# Patient Record
Sex: FEMALE | Race: WHITE | URBAN - METROPOLITAN AREA
[De-identification: names, ages, dates, MRNs, and addresses within clinical notes are randomized per-mention and may not be internally consistent; named-entity substitution may affect disease eponyms.]

---

## 2024-06-03 ENCOUNTER — TELEPHONE (OUTPATIENT)
Age: 64
End: 2024-06-03

## 2024-06-03 NOTE — TELEPHONE ENCOUNTER
Pt called stating Sharmila had asked for dosages pt was taking of calcium and vit D, pt states she is taking calcium 600 mg with vit D 20 mcg and then also a separate vit D that is 50 mcg - can you please advise if pt should continue these?

## 2024-06-03 NOTE — TELEPHONE ENCOUNTER
Called and notifiy patient  Jose Armando. Verified patient  on her HIPAA form. Informed if patient has any questions to feel free to call the office

## 2024-06-24 ENCOUNTER — TELEPHONE (OUTPATIENT)
Age: 64
End: 2024-06-24

## 2024-06-24 NOTE — TELEPHONE ENCOUNTER
Ghazal called about a bill from the old office from a visit back in April 2024.    She states she called [facility] and they told her she has no balance. She came in and talked to Malini and she said no payment. Last statement is from April 2024. But she got a phone automated message yesterday 6/23 about if she doesn't pay her bill it is going to collections. The amount is like $18.44?    Is there a way to check in the old system for an outstanding balance?    She is going to call this collection number and see if they are up to date on what is going about the bill.     Please call her back and follow up with this.    Thank you.

## 2024-10-08 ENCOUNTER — TELEPHONE (OUTPATIENT)
Age: 64
End: 2024-10-08

## 2024-10-08 NOTE — TELEPHONE ENCOUNTER
Submitted prior auth request through [email or web address]. medication needs to be approved and we will need medication shipped to our office prior to 10/28/24 appointment. Faxed lab order to [facility] central scheduling.

## 2024-10-08 NOTE — TELEPHONE ENCOUNTER
----- Message from Reina COLEMAN sent at 10/7/2024 10:15 AM EDT -----  Regarding: Labs for Prolia  Patient called and scheduled Prolia inject. 10/28/2024.  She needs [facility] req. To [facility].

## 2024-10-21 ENCOUNTER — TELEPHONE (OUTPATIENT)
Age: 64
End: 2024-10-21

## 2024-10-21 DIAGNOSIS — M81.0 AGE-RELATED OSTEOPOROSIS WITHOUT CURRENT PATHOLOGICAL FRACTURE: Primary | ICD-10-CM

## 2024-10-21 NOTE — TELEPHONE ENCOUNTER
[facility] called, pt is there for Vit D and calcium lab draw for upcoming Prolia but they do not have the orders there, there is a note it was faxed previously but I cannot find that order - can new Vit D and calcium order please be done? I will fax to [phone]

## 2024-10-22 ENCOUNTER — TELEPHONE (OUTPATIENT)
Age: 64
End: 2024-10-22

## 2024-10-22 DIAGNOSIS — M81.0 AGE-RELATED OSTEOPOROSIS WITHOUT CURRENT PATHOLOGICAL FRACTURE: Primary | ICD-10-CM

## 2024-10-22 NOTE — TELEPHONE ENCOUNTER
Kodi from [email or web address] called stating they got the approval for the prolia however they need a current script sent to [email or web address].

## 2024-10-23 NOTE — TELEPHONE ENCOUNTER
Not sure what is going on. We have received patient's Prolia medication, in fridge. Nothing else needed now for Rx. Lab orders already faxed. Lab results are in care everywhere through [facility].

## 2024-10-23 NOTE — TELEPHONE ENCOUNTER
Prolia order placed. Printed. Please make sure patient is aware that she will need to have blood work completed (and reviewed) prior to each injection.

## 2024-10-28 ENCOUNTER — NURSE ONLY (OUTPATIENT)
Age: 64
End: 2024-10-28
Payer: COMMERCIAL

## 2024-10-28 VITALS — OXYGEN SATURATION: 96 % | HEART RATE: 67 BPM | SYSTOLIC BLOOD PRESSURE: 136 MMHG | DIASTOLIC BLOOD PRESSURE: 70 MMHG

## 2024-10-28 DIAGNOSIS — M81.0 AGE-RELATED OSTEOPOROSIS WITHOUT CURRENT PATHOLOGICAL FRACTURE: ICD-10-CM

## 2024-10-28 DIAGNOSIS — M81.0 AGE-RELATED OSTEOPOROSIS WITHOUT CURRENT PATHOLOGICAL FRACTURE: Primary | ICD-10-CM

## 2024-10-28 PROCEDURE — 99214 OFFICE O/P EST MOD 30 MIN: CPT | Performed by: NURSE PRACTITIONER

## 2024-10-28 PROCEDURE — 3078F DIAST BP <80 MM HG: CPT | Performed by: NURSE PRACTITIONER

## 2024-10-28 PROCEDURE — 3075F SYST BP GE 130 - 139MM HG: CPT | Performed by: NURSE PRACTITIONER

## 2024-10-28 NOTE — PROGRESS NOTES
[facility], [facility] UROGYNECOLOGY AND PELVIC REHABILITATION   [address] [place] [address]  Dept: [phone]  Date: 10/28/2024  Patient Name: Ghazal De Paz    VISIT - FOLLOW UP VISIT     CC: Prolia injection, discuss osteoporosis, concerns with future insurance coverage      HPI: Pt has been using Prolia for just over two years, no SE, desires to continue to decrease risk of fracture.   Calcium and vit d 10/21/24 wnl  Comprehensive metabolic panel  Order: [id number]  Component  Ref Range & Units 10/21/24 1047   Sodium  134 - 146 mmol/L 140   Potassium, Bld  3.5 - 5.0 mmol/L 4.1   Chloride  98 - 109 mmol/L 104   CO2  22 - 32 mmol/L 26   Anion gap  5 - 15 mmol/L 10   BUN  5 - 27 mg/dL 12   Creatinine  0.40 - 1.00 mg/dL 0.66   Comment: METHOD TRACEABLE TO IDMS STANDARD   Glucose  65 - 99 mg/dL 93   Calcium  8.5 - 10.5 mg/dL 9.6   Total Protein  6.0 - 8.0 g/dL 7.1   Albumin  3.2 - 5.3 g/dL 4.4   Alkaline Phosphatase  39 - 130 U/L 50   AST  0 - 41 U/L 23   ALT  0 - 31 U/L 23   Total bilirubin  0.3 - 1.2 mg/dL 0.6   eGFR (CKD-EPI)non-race dependent  >59 ml/min/1.73sq.m >90     Overall state of well-being, dietary and nutritional habits, exercise routines of at least 30 minutes 3 times a week, bowel and bladder function, smoking history, HRT history, sexual activity and partner/s, dyspareunia, and immunizations all revisited if necessary by chart review or direct questioning.    Topics of Prolapse, Pain, Pressure were revisited including type, period of onset, level of severity, quality and quantity, associations, trends, exacerbators, alleviators, bleeding, prolapse to reduce, splinting, and prior treatment / surgery.    Topics of Urinary Leakage were revisited including type, period of onset, level of severity, quality and quantity, associations, trends, exacerbators, alleviators, urgency, frequency, nocturia ,urge incontinence / stress

## 2024-10-29 RX ORDER — DENOSUMAB 60 MG/ML
INJECTION SUBCUTANEOUS
Qty: 1 ML | Refills: 0 | Status: SHIPPED | OUTPATIENT
Start: 2024-10-29

## 2024-10-29 RX ORDER — DENOSUMAB 60 MG/ML
INJECTION SUBCUTANEOUS SEE ADMIN INSTRUCTIONS
COMMUNITY
End: 2024-10-29

## 2024-10-29 RX ORDER — LORATADINE 10 MG/1
10 TABLET ORAL DAILY PRN
COMMUNITY
Start: 2024-08-26

## 2024-10-29 RX ORDER — LISINOPRIL 5 MG/1
5 TABLET ORAL EVERY MORNING
COMMUNITY

## 2025-02-20 NOTE — PROGRESS NOTES
voluntary basis. Reminders to complete the questionnaires will not be given. The patient understands that failure to return the questionnaires may not give the provider a full picture of the patient's urogynecologic issues and make treatment less than optimal despite the practitioner's best effort to obtain information.     Multiple records reviewed. All questions were addressed to the patient's satisfaction.  40 min total, 20 related to issues    Follow up: No follow-ups on file.     Electronically signed by WENCESLAO Omalley CNP on 2/24/2025 at 3:13 PM    EMR / Voice Dictation Disclaimer: - This note is created with the assistance of a speech recognition program.  While intending to generate a timely document that accurately reflects the content of the visit, there is no guarantee every grammatical, syntax, or spelling error has been or will be identified or corrected.  Additionally, system limitations of the EMR and voice recognition software beyond the control of the practitioner may cause unintentional errors or omissions not identified or corrected at the time of record finalization and signature.

## 2025-02-24 ENCOUNTER — HOSPITAL ENCOUNTER (OUTPATIENT)
Age: 65
Setting detail: SPECIMEN
Discharge: HOME | End: 2025-02-24

## 2025-02-24 ENCOUNTER — OFFICE VISIT (OUTPATIENT)
Age: 65
End: 2025-02-24
Payer: COMMERCIAL

## 2025-02-24 VITALS — HEART RATE: 77 BPM | SYSTOLIC BLOOD PRESSURE: 162 MMHG | OXYGEN SATURATION: 95 % | DIASTOLIC BLOOD PRESSURE: 87 MMHG

## 2025-02-24 DIAGNOSIS — M81.0 AGE-RELATED OSTEOPOROSIS WITHOUT CURRENT PATHOLOGICAL FRACTURE: ICD-10-CM

## 2025-02-24 DIAGNOSIS — I83.93 SPIDER VEINS OF BOTH LOWER EXTREMITIES: ICD-10-CM

## 2025-02-24 DIAGNOSIS — Z78.0 OSTEOPENIA AFTER MENOPAUSE: ICD-10-CM

## 2025-02-24 DIAGNOSIS — M85.89 OSTEOPENIA OF MULTIPLE SITES: ICD-10-CM

## 2025-02-24 DIAGNOSIS — M25.552 BILATERAL HIP PAIN: ICD-10-CM

## 2025-02-24 DIAGNOSIS — Z01.419 ENCOUNTER FOR ANNUAL ROUTINE GYNECOLOGICAL EXAMINATION: Primary | ICD-10-CM

## 2025-02-24 DIAGNOSIS — M25.551 BILATERAL HIP PAIN: ICD-10-CM

## 2025-02-24 DIAGNOSIS — N95.2 ATROPHIC VULVOVAGINITIS: ICD-10-CM

## 2025-02-24 DIAGNOSIS — M85.80 OSTEOPENIA AFTER MENOPAUSE: ICD-10-CM

## 2025-02-24 DIAGNOSIS — Z11.51 SCREENING FOR HPV (HUMAN PAPILLOMAVIRUS): ICD-10-CM

## 2025-02-24 DIAGNOSIS — Z12.31 ENCOUNTER FOR SCREENING MAMMOGRAM FOR MALIGNANT NEOPLASM OF BREAST: ICD-10-CM

## 2025-02-24 DIAGNOSIS — Z87.410 HISTORY OF CERVICAL DYSPLASIA: ICD-10-CM

## 2025-02-24 PROCEDURE — 3077F SYST BP >= 140 MM HG: CPT | Performed by: NURSE PRACTITIONER

## 2025-02-24 PROCEDURE — 99213 OFFICE O/P EST LOW 20 MIN: CPT | Performed by: NURSE PRACTITIONER

## 2025-02-24 PROCEDURE — 3079F DIAST BP 80-89 MM HG: CPT | Performed by: NURSE PRACTITIONER

## 2025-02-24 RX ORDER — DENOSUMAB 60 MG/ML
60 INJECTION SUBCUTANEOUS ONCE
Qty: 1 ML | Refills: 0 | Status: SHIPPED | OUTPATIENT
Start: 2025-02-24 | End: 2025-02-24

## 2025-02-24 ASSESSMENT — ENCOUNTER SYMPTOMS: CONSTIPATION: 1

## 2025-02-24 NOTE — PATIENT INSTRUCTIONS
Daily fiber, miralax/stool softener as needed to prevent constipation/hard stools, increased fiber in diet/water  If you have issues scheduling with Dr. Van for hip pain let me know  Prolia due in April, labs one week prior  Patient wants to check with her insurance to see who is covered in vascular, desires referral for spider veins

## 2025-02-26 LAB
HPV I/H RISK 4 DNA CVX QL NAA+PROBE: NOT DETECTED
HPV SAMPLE: NORMAL
HPV, INTERPRETATION: NORMAL
HPV16 DNA CVX QL NAA+PROBE: NOT DETECTED
HPV18 DNA CVX QL NAA+PROBE: NOT DETECTED
SPECIMEN DESCRIPTION: NORMAL

## 2025-02-28 LAB — CYTOLOGY REPORT: NORMAL

## 2025-03-18 ENCOUNTER — OFFICE VISIT (OUTPATIENT)
Age: 65
End: 2025-03-18
Payer: COMMERCIAL

## 2025-03-18 VITALS — BODY MASS INDEX: 25.2 KG/M2 | WEIGHT: 125 LBS | HEIGHT: 59 IN

## 2025-03-18 DIAGNOSIS — M25.551 RIGHT HIP PAIN: Primary | ICD-10-CM

## 2025-03-18 DIAGNOSIS — M16.11 PRIMARY OSTEOARTHRITIS OF RIGHT HIP: ICD-10-CM

## 2025-03-18 PROCEDURE — 99204 OFFICE O/P NEW MOD 45 MIN: CPT | Performed by: ORTHOPAEDIC SURGERY

## 2025-03-18 NOTE — PROGRESS NOTES
of Paying Living Expenses: Not hard at all   Food Insecurity: No Food Insecurity (2025)    [facility]    Hunger Screening     Within the past 12 months we worried whether our food would run out before we got money to buy more.: Never True     Within the past 12 months the food we bought just didn't last and we didn't have money to get more.: Never True   Transportation Needs: No Transportation Needs (2025)    [facility]    PRAPARE - Transportation     Lack of Transportation (Medical): No     Lack of Transportation (Non-Medical): No   Physical Activity: Not on file   Stress: Not on file   Social Connections: Not on file   Intimate Partner Violence: Not on file   Housing Stability: Low Risk  (2025)    [facility]    Housing Instability     Are you worried or concerned that in the next two months you may not have stable housing that you own, rent or stay in as a part of a household?: No     Past Medical History:   Diagnosis Date    Abnormal cervical Papanicolaou smear     Age-related osteoporosis without current pathological fracture     Atrophic vaginitis     External hemorrhoids     Heart murmur     Hip pain, right     Hypertension     Incontinence in female     Personal history of cervical dysplasia     Urinary retention      Past Surgical History:   Procedure Laterality Date     SECTION      DILATION AND CURETTAGE      ENDOMETRIAL ABLATION      HYSTEROSCOPY      LAPAROSCOPY      LEEP      TUBAL LIGATION       No family history on file.       Please note that this chart was generated using voice recognition Dragon dictation software.  Although every effort was made to ensure the accuracy of this automated transcription, some errors in transcription may have occurred.

## 2025-04-21 LAB
ALBUMIN: NORMAL
ALP BLD-CCNC: NORMAL U/L
ALT SERPL-CCNC: NORMAL U/L
ANION GAP SERPL CALCULATED.3IONS-SCNC: NORMAL MMOL/L
AST SERPL-CCNC: NORMAL U/L
BILIRUB SERPL-MCNC: NORMAL MG/DL
BUN BLDV-MCNC: NORMAL MG/DL
CALCIUM SERPL-MCNC: NORMAL MG/DL
CHLORIDE BLD-SCNC: NORMAL MMOL/L
CO2: NORMAL
CREAT SERPL-MCNC: NORMAL MG/DL
GFR, ESTIMATED: NORMAL
GLUCOSE BLD-MCNC: NORMAL MG/DL
POTASSIUM SERPL-SCNC: NORMAL MMOL/L
SODIUM BLD-SCNC: NORMAL MMOL/L
TOTAL PROTEIN: NORMAL
VITAMIN D 25-HYDROXY: NORMAL
VITAMIN D2, 25 HYDROXY: NORMAL
VITAMIN D3,25 HYDROXY: NORMAL

## 2025-04-24 ENCOUNTER — TELEPHONE (OUTPATIENT)
Age: 65
End: 2025-04-24

## 2025-04-24 ENCOUNTER — RESULTS FOLLOW-UP (OUTPATIENT)
Age: 65
End: 2025-04-24

## 2025-04-24 DIAGNOSIS — M81.0 AGE-RELATED OSTEOPOROSIS WITHOUT CURRENT PATHOLOGICAL FRACTURE: Primary | ICD-10-CM

## 2025-04-24 DIAGNOSIS — M81.0 AGE-RELATED OSTEOPOROSIS WITHOUT CURRENT PATHOLOGICAL FRACTURE: ICD-10-CM

## 2025-04-24 RX ORDER — DENOSUMAB 60 MG/ML
60 INJECTION SUBCUTANEOUS ONCE
Qty: 1 ML | Refills: 0 | Status: SHIPPED | OUTPATIENT
Start: 2025-04-24 | End: 2025-04-24

## 2025-04-24 NOTE — TELEPHONE ENCOUNTER
Pt called stating that she had labs done at [facility] on Monday for prolia injection, wanting to make sure we received results. Pulled from care everywhere, Please review and send order for Prolia if okay pt has appt 5/5/25 for injection

## 2025-04-24 NOTE — TELEPHONE ENCOUNTER
Prolia approved through 4/2026  [facility] specialty pharmacy is still processing Rx. I will check on this next week.   Outcome  Additional Information Required  This medication or product was previously approved on [id number] from 2024-04-02 to 2026-04-02. **Please note: This request was submitted electronically. Formulary lowering, tiering exception, cost reduction and/or pre-benefit determination review (including prospective Medicare hospice reviews) requests cannot be requested using this method of submission. Providers contact us at [phone] for further assistance.

## 2025-04-24 NOTE — TELEPHONE ENCOUNTER
Prolia approved through 4/2026      Outcome  Additional Information Required  This medication or product was previously approved on [id number] from 2024-04-02 to 2026-04-02. **Please note: This request was submitted electronically. Formulary lowering, tiering exception, cost reduction and/or pre-benefit determination review (including prospective Medicare hospice reviews) requests cannot be requested using this method of submission. Providers contact us at [phone] for further assistance.

## 2025-04-28 NOTE — PROGRESS NOTES
[facility], [facility] UROGYNECOLOGY AND PELVIC REHABILITATION   [address] [place] [address]  Dept: [phone]  Date: 2025  Patient Name: Ghazal De Paz    VISIT - FOLLOW UP VISIT     CC: osteoporosis, prolia injection, spider veins    Chaperone present for entire visit and pertinent physical exam:None Required    HPI:  Prolia started . No SE.    25 vit D and CMP WNL.  Dexa 24.  Pt requests referral for spider veins bilaterally, no leg pain/swelling.    Overall state of well-being, dietary and nutritional habits, exercise routines of at least 30 minutes 3 times a week, bowel and bladder function, smoking history, HRT history, sexual activity and partner/s, dyspareunia, and immunizations all revisited if necessary by chart review or direct questioning.    Topics of Prolapse, Pain, Pressure were revisited including type, period of onset, level of severity, quality and quantity, associations, trends, exacerbators, alleviators, bleeding, prolapse to reduce, splinting, and prior treatment / surgery.    Topics of Urinary Leakage were revisited including type, period of onset, level of severity, quality and quantity, associations, trends, exacerbators, alleviators, urgency, frequency, nocturia ,urge incontinence / stress incontinence triggers, hesitancy, obstruction with need to catheterize, frequent UTI\"s >3 in a year diagnosed by culture, hematuria (gross or microscopic), urinary stones, and prior treatment / surgery.    Topics of Fecal Incontinence were revisited including type, period of onset, level of severity, quality and quantity, associations, trends, exacerbators, alleviators, times per day/week, stool quality / quantity, rectal bleeding, hemorrhoids, constipation / Anismus / Proctalgia fugax, laxative abuse, and prior treatment / surgery.    Topics of General Gynecology were revisited including gravity/parity, #'s,

## 2025-04-29 ENCOUNTER — OFFICE VISIT (OUTPATIENT)
Age: 65
End: 2025-04-29
Payer: COMMERCIAL

## 2025-04-29 VITALS — HEIGHT: 59 IN | BODY MASS INDEX: 25.2 KG/M2 | WEIGHT: 125 LBS

## 2025-04-29 DIAGNOSIS — M25.551 PAIN OF RIGHT HIP: Primary | ICD-10-CM

## 2025-04-29 PROCEDURE — 99214 OFFICE O/P EST MOD 30 MIN: CPT | Performed by: ORTHOPAEDIC SURGERY

## 2025-04-29 NOTE — PROGRESS NOTES
[facility] Orthopedics & Sports Medicine      [facility] ORTHOPAEDICS AND SPORTS MEDICINE  [address] [place] [address] [id number]  [place] [address]  Dept: [phone]  Dept Fax: [phone]    Chief Compliant:  Chief Complaint   Patient presents with    Hip Pain     Right hip follow up         History of Present Illness:  This is a 64 y.o. female who presents to the clinic today for evaluation / follow up of right hip steroid injection.  She states she really did not notice any significant improvement with this.  Predominant her pain is over the middle of the thigh over the lateral aspect almost of the mid substance of the IT band.  But not at the knee or greater trochanteric bursa area.  She also denies any buttock or groin pain.  The pain does not really get bad enough that it stops her from doing anything but she just is annoyed with it when it comes when she starts to walk more is more active.  Some days it is and that not there at all other days it will be more significant.  But it does come and go.  She does take Prolia for osteoporosis.       Physical Exam:    On exam today she tolerates hip range of motion quite well with no pain in the groin or greater trochanteric bursa on palpation.  She has 5-5 hip flexion strength with no discomfort.  She has notable discomfort about the knee today no effusion.  She just has very minor to minimal to almost no discomfort over the area of tenderness over the lateral aspect of the thigh mid substance.  No tenderness more distally or proximally.  There is no mass or crepitus over this area.    Nursing note and vitals reviewed.     Labs and Imaging:     XR taken today:  No results found.      No orders of the defined types were placed in this encounter.      Assessment and Plan:  No diagnosis found.      This is a 64 y.o. female with right leg pain.  I discussed with her that it really does not correlate with hip pain or knee

## 2025-05-05 ENCOUNTER — CLINICAL SUPPORT (OUTPATIENT)
Age: 65
End: 2025-05-05
Payer: COMMERCIAL

## 2025-05-05 VITALS
DIASTOLIC BLOOD PRESSURE: 71 MMHG | TEMPERATURE: 98.9 F | SYSTOLIC BLOOD PRESSURE: 134 MMHG | OXYGEN SATURATION: 95 % | HEART RATE: 70 BPM

## 2025-05-05 DIAGNOSIS — M81.0 AGE-RELATED OSTEOPOROSIS WITHOUT CURRENT PATHOLOGICAL FRACTURE: Primary | ICD-10-CM

## 2025-05-05 DIAGNOSIS — I83.93 SPIDER VEINS OF BOTH LOWER EXTREMITIES: ICD-10-CM

## 2025-05-05 PROCEDURE — 3078F DIAST BP <80 MM HG: CPT | Performed by: NURSE PRACTITIONER

## 2025-05-05 PROCEDURE — 3075F SYST BP GE 130 - 139MM HG: CPT | Performed by: NURSE PRACTITIONER

## 2025-05-05 PROCEDURE — 99213 OFFICE O/P EST LOW 20 MIN: CPT | Performed by: NURSE PRACTITIONER

## 2025-05-05 RX ORDER — CICLOPIROX 80 MG/ML
1 SOLUTION TOPICAL NIGHTLY
COMMUNITY
Start: 2025-02-25

## 2025-07-10 ENCOUNTER — TELEPHONE (OUTPATIENT)
Dept: INTERVENTIONAL RADIOLOGY/VASCULAR | Age: 65
End: 2025-07-10